# Patient Record
Sex: FEMALE | Race: BLACK OR AFRICAN AMERICAN | NOT HISPANIC OR LATINO | URBAN - METROPOLITAN AREA
[De-identification: names, ages, dates, MRNs, and addresses within clinical notes are randomized per-mention and may not be internally consistent; named-entity substitution may affect disease eponyms.]

---

## 2019-06-16 ENCOUNTER — EMERGENCY (EMERGENCY)
Facility: HOSPITAL | Age: 44
LOS: 0 days | Discharge: HOME | End: 2019-06-17
Attending: EMERGENCY MEDICINE | Admitting: EMERGENCY MEDICINE
Payer: SELF-PAY

## 2019-06-16 VITALS
OXYGEN SATURATION: 100 % | DIASTOLIC BLOOD PRESSURE: 79 MMHG | HEIGHT: 63 IN | RESPIRATION RATE: 16 BRPM | SYSTOLIC BLOOD PRESSURE: 122 MMHG | WEIGHT: 139.99 LBS | HEART RATE: 68 BPM | TEMPERATURE: 98 F

## 2019-06-16 DIAGNOSIS — L23.6 ALLERGIC CONTACT DERMATITIS DUE TO FOOD IN CONTACT WITH THE SKIN: ICD-10-CM

## 2019-06-16 DIAGNOSIS — Z91.013 ALLERGY TO SEAFOOD: ICD-10-CM

## 2019-06-16 PROCEDURE — 99284 EMERGENCY DEPT VISIT MOD MDM: CPT | Mod: 25

## 2019-06-16 NOTE — ED ADULT TRIAGE NOTE - CHIEF COMPLAINT QUOTE
Hx shellfish allergy, was cleaning a freezer and ice that had touched shellfish went into her eye. Left eye swelling. No respiratory complaints. VS stable.

## 2019-06-17 RX ORDER — FAMOTIDINE 10 MG/ML
20 INJECTION INTRAVENOUS ONCE
Refills: 0 | Status: COMPLETED | OUTPATIENT
Start: 2019-06-17 | End: 2019-06-17

## 2019-06-17 RX ORDER — DIPHENHYDRAMINE HCL 50 MG
1 CAPSULE ORAL
Qty: 20 | Refills: 0
Start: 2019-06-17 | End: 2019-06-26

## 2019-06-17 RX ORDER — DIPHENHYDRAMINE HCL 50 MG
25 CAPSULE ORAL ONCE
Refills: 0 | Status: COMPLETED | OUTPATIENT
Start: 2019-06-17 | End: 2019-06-17

## 2019-06-17 RX ORDER — EPINEPHRINE 0.3 MG/.3ML
0.3 INJECTION INTRAMUSCULAR; SUBCUTANEOUS
Qty: 0.3 | Refills: 0
Start: 2019-06-17

## 2019-06-17 RX ORDER — FAMOTIDINE 10 MG/ML
1 INJECTION INTRAVENOUS
Qty: 20 | Refills: 0
Start: 2019-06-17 | End: 2019-06-26

## 2019-06-17 RX ADMIN — Medication 2 DROP(S): at 00:45

## 2019-06-17 RX ADMIN — FAMOTIDINE 20 MILLIGRAM(S): 10 INJECTION INTRAVENOUS at 00:47

## 2019-06-17 RX ADMIN — Medication 25 MILLIGRAM(S): at 00:47

## 2019-06-17 NOTE — ED PROVIDER NOTE - NSFOLLOWUPINSTRUCTIONS_ED_ALL_ED_FT
please follow up with clinic in 2-3 days.   take medications as prescribed.   return to ED for any concerns

## 2019-06-17 NOTE — ED ADULT NURSE NOTE - OBJECTIVE STATEMENT
Pt biba c/o having "touched ice that had touched shellfish while cleaning the freezer at work" Pt is allergic to shellfish. Pt has mild swelling to above left eye. No visual changes, no shortness of breath, no throat itching. Respirations regular. Pt denies chest pain. Pt is alert and oriented x4, moves all extremities, able to ambulate without distress. No vomiting/nausea

## 2019-06-17 NOTE — ED ADULT NURSE NOTE - CHPI ED NUR SYMPTOMS NEG
no throat itching/no rash/no vomiting/no shortness of breath/no swelling of face, tongue/no difficulty breathing/no congestion/no nausea/no difficulty swallowing

## 2019-06-17 NOTE — ED PROVIDER NOTE - NSFOLLOWUPCLINICS_GEN_ALL_ED_FT
[facility]  Family Medicine  [address] [place] [address]  Phone: [phone]  Fax:   Follow Up Time:

## 2019-06-17 NOTE — ED PROVIDER NOTE - OBJECTIVE STATEMENT
44 y/o female in ED c/o allergic reaction to shrimp juice x 1 hr.   pt states was cleaning freezer when ice chips from a bag of shrimp got into her eye.   pt states h/o allergy to shellfish.   states then noticed itching and left eye swelling.    no meds taken PTA.   pt denies any fever, HA change in vision, sore throat, cp, sob, n/v/d/abd pain.    tolerating PO

## 2020-09-25 ENCOUNTER — EMERGENCY (EMERGENCY)
Facility: HOSPITAL | Age: 45
LOS: 0 days | Discharge: HOME | End: 2020-09-25
Attending: EMERGENCY MEDICINE
Payer: MEDICAID

## 2020-09-25 VITALS
HEART RATE: 73 BPM | OXYGEN SATURATION: 100 % | TEMPERATURE: 99 F | SYSTOLIC BLOOD PRESSURE: 114 MMHG | RESPIRATION RATE: 20 BRPM | DIASTOLIC BLOOD PRESSURE: 78 MMHG

## 2020-09-25 VITALS
WEIGHT: 149.91 LBS | SYSTOLIC BLOOD PRESSURE: 132 MMHG | HEART RATE: 83 BPM | OXYGEN SATURATION: 100 % | TEMPERATURE: 98 F | RESPIRATION RATE: 18 BRPM | DIASTOLIC BLOOD PRESSURE: 87 MMHG | HEIGHT: 63 IN

## 2020-09-25 DIAGNOSIS — R06.02 SHORTNESS OF BREATH: ICD-10-CM

## 2020-09-25 DIAGNOSIS — R07.9 CHEST PAIN, UNSPECIFIED: ICD-10-CM

## 2020-09-25 DIAGNOSIS — Z91.013 ALLERGY TO SEAFOOD: ICD-10-CM

## 2020-09-25 DIAGNOSIS — G40.909 EPILEPSY, UNSPECIFIED, NOT INTRACTABLE, WITHOUT STATUS EPILEPTICUS: ICD-10-CM

## 2020-09-25 LAB
ADD ON TEST-SPECIMEN IN LAB: SIGNIFICANT CHANGE UP
ALBUMIN SERPL ELPH-MCNC: 4 G/DL — SIGNIFICANT CHANGE UP (ref 3.3–5)
ALP SERPL-CCNC: 76 U/L — SIGNIFICANT CHANGE UP (ref 40–120)
ALT FLD-CCNC: 68 U/L — SIGNIFICANT CHANGE UP (ref 12–78)
ANION GAP SERPL CALC-SCNC: 10 MMOL/L — SIGNIFICANT CHANGE UP (ref 5–17)
AST SERPL-CCNC: 74 U/L — HIGH (ref 15–37)
BASOPHILS # BLD AUTO: 0.02 K/UL — SIGNIFICANT CHANGE UP (ref 0–0.2)
BASOPHILS NFR BLD AUTO: 0.2 % — SIGNIFICANT CHANGE UP (ref 0–2)
BILIRUB SERPL-MCNC: 0.7 MG/DL — SIGNIFICANT CHANGE UP (ref 0.2–1.2)
BUN SERPL-MCNC: 5 MG/DL — LOW (ref 7–23)
CALCIUM SERPL-MCNC: 9.1 MG/DL — SIGNIFICANT CHANGE UP (ref 8.5–10.1)
CHLORIDE SERPL-SCNC: 103 MMOL/L — SIGNIFICANT CHANGE UP (ref 96–108)
CO2 SERPL-SCNC: 24 MMOL/L — SIGNIFICANT CHANGE UP (ref 22–31)
CREAT SERPL-MCNC: 0.78 MG/DL — SIGNIFICANT CHANGE UP (ref 0.5–1.3)
D DIMER BLD IA.RAPID-MCNC: <150 NG/ML DDU — SIGNIFICANT CHANGE UP
EOSINOPHIL # BLD AUTO: 0.03 K/UL — SIGNIFICANT CHANGE UP (ref 0–0.5)
EOSINOPHIL NFR BLD AUTO: 0.2 % — SIGNIFICANT CHANGE UP (ref 0–6)
GLUCOSE SERPL-MCNC: 94 MG/DL — SIGNIFICANT CHANGE UP (ref 70–99)
HCT VFR BLD CALC: 39.8 % — SIGNIFICANT CHANGE UP (ref 34.5–45)
HGB BLD-MCNC: 12.5 G/DL — SIGNIFICANT CHANGE UP (ref 11.5–15.5)
IMM GRANULOCYTES NFR BLD AUTO: 0.4 % — SIGNIFICANT CHANGE UP (ref 0–1.5)
LYMPHOCYTES # BLD AUTO: 18 % — SIGNIFICANT CHANGE UP (ref 13–44)
LYMPHOCYTES # BLD AUTO: 2.22 K/UL — SIGNIFICANT CHANGE UP (ref 1–3.3)
MCHC RBC-ENTMCNC: 24.1 PG — LOW (ref 27–34)
MCHC RBC-ENTMCNC: 31.4 GM/DL — LOW (ref 32–36)
MCV RBC AUTO: 76.8 FL — LOW (ref 80–100)
MONOCYTES # BLD AUTO: 0.84 K/UL — SIGNIFICANT CHANGE UP (ref 0–0.9)
MONOCYTES NFR BLD AUTO: 6.8 % — SIGNIFICANT CHANGE UP (ref 2–14)
NEUTROPHILS # BLD AUTO: 9.14 K/UL — HIGH (ref 1.8–7.4)
NEUTROPHILS NFR BLD AUTO: 74.4 % — SIGNIFICANT CHANGE UP (ref 43–77)
NT-PROBNP SERPL-SCNC: 157 PG/ML — HIGH (ref 0–125)
PLATELET # BLD AUTO: 232 K/UL — SIGNIFICANT CHANGE UP (ref 150–400)
POTASSIUM SERPL-MCNC: 3.6 MMOL/L — SIGNIFICANT CHANGE UP (ref 3.5–5.3)
POTASSIUM SERPL-SCNC: 3.6 MMOL/L — SIGNIFICANT CHANGE UP (ref 3.5–5.3)
PROT SERPL-MCNC: 7.8 GM/DL — SIGNIFICANT CHANGE UP (ref 6–8.3)
RBC # BLD: 5.18 M/UL — SIGNIFICANT CHANGE UP (ref 3.8–5.2)
RBC # FLD: 13.7 % — SIGNIFICANT CHANGE UP (ref 10.3–14.5)
SARS-COV-2 RNA SPEC QL NAA+PROBE: SIGNIFICANT CHANGE UP
SODIUM SERPL-SCNC: 137 MMOL/L — SIGNIFICANT CHANGE UP (ref 135–145)
TROPONIN I SERPL-MCNC: <0.015 NG/ML — SIGNIFICANT CHANGE UP (ref 0.01–0.04)
VALPROATE SERPL-MCNC: 19 UG/ML — LOW (ref 50–100)
WBC # BLD: 12.3 K/UL — HIGH (ref 3.8–10.5)
WBC # FLD AUTO: 12.3 K/UL — HIGH (ref 3.8–10.5)

## 2020-09-25 PROCEDURE — U0003: CPT

## 2020-09-25 PROCEDURE — 85379 FIBRIN DEGRADATION QUANT: CPT

## 2020-09-25 PROCEDURE — 83880 ASSAY OF NATRIURETIC PEPTIDE: CPT

## 2020-09-25 PROCEDURE — 96360 HYDRATION IV INFUSION INIT: CPT

## 2020-09-25 PROCEDURE — 71045 X-RAY EXAM CHEST 1 VIEW: CPT | Mod: 26

## 2020-09-25 PROCEDURE — 93010 ELECTROCARDIOGRAM REPORT: CPT

## 2020-09-25 PROCEDURE — 84484 ASSAY OF TROPONIN QUANT: CPT

## 2020-09-25 PROCEDURE — 80053 COMPREHEN METABOLIC PANEL: CPT

## 2020-09-25 PROCEDURE — 36415 COLL VENOUS BLD VENIPUNCTURE: CPT

## 2020-09-25 PROCEDURE — 85025 COMPLETE CBC W/AUTO DIFF WBC: CPT

## 2020-09-25 PROCEDURE — 71045 X-RAY EXAM CHEST 1 VIEW: CPT

## 2020-09-25 PROCEDURE — 93005 ELECTROCARDIOGRAM TRACING: CPT

## 2020-09-25 PROCEDURE — 80164 ASSAY DIPROPYLACETIC ACD TOT: CPT

## 2020-09-25 PROCEDURE — 99285 EMERGENCY DEPT VISIT HI MDM: CPT

## 2020-09-25 PROCEDURE — 99284 EMERGENCY DEPT VISIT MOD MDM: CPT | Mod: 25

## 2020-09-25 RX ORDER — SODIUM CHLORIDE 9 MG/ML
1000 INJECTION INTRAMUSCULAR; INTRAVENOUS; SUBCUTANEOUS ONCE
Refills: 0 | Status: COMPLETED | OUTPATIENT
Start: 2020-09-25 | End: 2020-09-25

## 2020-09-25 RX ORDER — ACETAMINOPHEN 500 MG
650 TABLET ORAL ONCE
Refills: 0 | Status: COMPLETED | OUTPATIENT
Start: 2020-09-25 | End: 2020-09-25

## 2020-09-25 RX ADMIN — Medication 650 MILLIGRAM(S): at 12:31

## 2020-09-25 RX ADMIN — SODIUM CHLORIDE 1000 MILLILITER(S): 9 INJECTION INTRAMUSCULAR; INTRAVENOUS; SUBCUTANEOUS at 12:32

## 2020-09-25 RX ADMIN — SODIUM CHLORIDE 1000 MILLILITER(S): 9 INJECTION INTRAMUSCULAR; INTRAVENOUS; SUBCUTANEOUS at 13:32

## 2020-09-25 NOTE — ED PROVIDER NOTE - PROGRESS NOTE DETAILS
Story not suspicious for acs.  HEART score 1.  EKG unremarkable.  Trop x1 negative - sufficient for eval given  timing  of symptoms.  Low risk PE and dimer negative.  No e/o ptx/pna/carditis.  Story not c/w dissection - below threshold for further w/u.  Stable for outpatient f/u.  D/c home with strict return precautions and prompt outpatient f/u.

## 2020-09-25 NOTE — ED PROVIDER NOTE - PATIENT PORTAL LINK FT
You can access the FollowMyHealth Patient Portal offered by [facility] by registering at the following website: [email or web address]. By joining [employer]’s FollowMyHealth portal, you will also be able to view your health information using other applications (apps) compatible with our system.

## 2020-09-25 NOTE — ED ADULT NURSE NOTE - CHPI ED NUR SYMPTOMS NEG
no chest pain/no chills/no diaphoresis/no edema/no headache/no cough/no hemoptysis/no body aches/no wheezing

## 2020-09-25 NOTE — ED ADULT NURSE NOTE - OBJECTIVE STATEMENT
Patient states she has been Patient presents with shortness of breath for 1 week. Patient states she drank alcohol last night and today feels the Patient presents with shortness of breath is worse. Patient color good.

## 2020-09-25 NOTE — ED ADULT TRIAGE NOTE - CHIEF COMPLAINT QUOTE
Patient comes to ED for SOB for a few days. Patient reports going to train today for work and SOB worsened. Patient is a smoker. Patient reports diarrhea, feeling cold. O2 sat per % on room air. Patient AxOx4. Denies any respiratory hx.

## 2020-09-25 NOTE — ED PROVIDER NOTE - CARE PROVIDER_API CALL
Lizbet Gonzalez  CARDIOVASCULAR DISEASE  [address] [place] [address]  Phone: [phone]  Fax: [phone]  Follow Up Time: 1-3 Days

## 2020-09-25 NOTE — ED PROVIDER NOTE - OBJECTIVE STATEMENT
Pt with PMHx of seizures, presents to the ED c/o SOB for couple of days, chest pain x3 days, diarrhea.  Pt she went to work today through pain, states she was feeling warm yesterday and cold today. Denies fever, HA, cough. Pt with PMHx of seizures, presents to the ED c/o SOB for couple of days, chest pain x3 days, diarrhea.  Pt she went to work today when SOB worsened, states she was feeling warm yesterday and cold today. Denies fever, HA, cough.

## 2022-10-17 NOTE — ED PROVIDER NOTE - FAMILY HISTORY
No pertinent family history in first degree relatives
Charlette Ly  OBSTETRICS AND GYNECOLOGY  [address]  [place] [address]  Phone: [phone]  Fax: [phone]  Follow Up Time:

## 2023-02-10 NOTE — ED PROVIDER NOTE - CCCP TRG CHIEF CMPLNT
Patient has graduated from the Care Transitions program on 2.10.23. Patient/family has the ability to self-manage at this time. Patient has no further care management needs, no referral to the [facility] team for further management. Patient has [facility] Care Coordinator's contact information for any further questions, concerns, or needs.   Patients upcoming visits:    Future Appointments   Date Time Provider Yael Jimenez   2/15/2023 11:40 AM Marichuy Frances MD [facility] AMB   2/17/2023  9:00 AM HERMINIA Nelson 48   2/23/2023 To Be Determined HERMINIA Nelson   3/2/2023 To Be Determined HERMINIA Nelson 48   3/9/2023 To Be Determined HERMINIA Nelson 48   3/16/2023 To Be Determined HERMINIA Nelson 48   3/16/2023  2:20 PM Geno [address] [facility]   3/16/2023  3:00 PM POD1A [address] [facility]   6/22/2023  1:50 PM [facility] OUTREACH INSURANCE R Wilda Cifuentes 23 [facility]   6/22/2023  2:30 PM Serg Padilla MD [facility] GVL AMB   6/22/2023  3:30 PM POD3B 156 [facility]
allergic reaction

## 2023-06-07 PROBLEM — Z00.00 ENCOUNTER FOR PREVENTIVE HEALTH EXAMINATION: Status: ACTIVE | Noted: 2023-06-07

## 2023-06-26 ENCOUNTER — APPOINTMENT (OUTPATIENT)
Dept: ORTHOPEDIC SURGERY | Facility: CLINIC | Age: 48
End: 2023-06-26

## 2024-10-10 NOTE — ED PROVIDER NOTE - SKIN, MLM
Lumbar abscess aspiration complete. Pt tolerated well. VSS. No signs or symptoms of distress noted. Pt will be transferred to MPU bed escorted by RN and report to RN on arrival and called to floor RN.   Specimen to lab   Skin normal color for race, warm, dry and intact. No evidence of rash.

## 2024-11-20 NOTE — ED ADULT NURSE NOTE - PAIN: PRESENCE, MLM
Please Approve or Refuse.  Send to Pharmacy per Pt's Request: [facility]     Next Visit Date:  1/10/2025   Last Visit Date: 9/10/2024    Hemoglobin A1C (%)   Date Value   08/02/2024 5.3   02/27/2024 5.6   10/16/2023 5.8             ( goal A1C is < 7)   BP Readings from Last 3 Encounters:   10/27/24 138/63   10/08/24 117/70   10/02/24 121/79          (goal 120/80)  BUN   Date Value Ref Range Status   10/27/2024 10 6 - 20 mg/dL Final     Creatinine   Date Value Ref Range Status   10/27/2024 0.6 (L) 0.7 - 1.2 mg/dL Final     Potassium   Date Value Ref Range Status   10/27/2024 3.7 3.7 - 5.3 mmol/L Final             
complains of pain/discomfort